# Patient Record
Sex: MALE | Race: OTHER | HISPANIC OR LATINO | Employment: UNEMPLOYED | URBAN - METROPOLITAN AREA
[De-identification: names, ages, dates, MRNs, and addresses within clinical notes are randomized per-mention and may not be internally consistent; named-entity substitution may affect disease eponyms.]

---

## 2023-04-16 ENCOUNTER — HOSPITAL ENCOUNTER (EMERGENCY)
Facility: HOSPITAL | Age: 43
Discharge: HOME | End: 2023-04-16
Attending: INTERNAL MEDICINE

## 2023-04-16 VITALS
HEIGHT: 69 IN | WEIGHT: 200 LBS | OXYGEN SATURATION: 98 % | SYSTOLIC BLOOD PRESSURE: 131 MMHG | HEART RATE: 77 BPM | RESPIRATION RATE: 20 BRPM | TEMPERATURE: 97 F | DIASTOLIC BLOOD PRESSURE: 87 MMHG | BODY MASS INDEX: 29.62 KG/M2

## 2023-04-16 DIAGNOSIS — T15.01XA FOREIGN BODY OF RIGHT CORNEA, INITIAL ENCOUNTER: Primary | ICD-10-CM

## 2023-04-16 PROCEDURE — 65220 REMOVE FOREIGN BODY FROM EYE: CPT

## 2023-04-16 PROCEDURE — 99283 EMERGENCY DEPT VISIT LOW MDM: CPT | Mod: 25

## 2023-04-16 PROCEDURE — 25000003 PHARM REV CODE 250: Performed by: INTERNAL MEDICINE

## 2023-04-16 PROCEDURE — 65222 REMOVE FOREIGN BODY FROM EYE: CPT | Mod: RT

## 2023-04-16 RX ORDER — PROPARACAINE HYDROCHLORIDE 5 MG/ML
1 SOLUTION/ DROPS OPHTHALMIC
Status: COMPLETED | OUTPATIENT
Start: 2023-04-16 | End: 2023-04-16

## 2023-04-16 RX ORDER — ERYTHROMYCIN 5 MG/G
OINTMENT OPHTHALMIC EVERY 4 HOURS
Qty: 3.5 G | Refills: 0 | Status: SHIPPED | OUTPATIENT
Start: 2023-04-16 | End: 2023-04-26

## 2023-04-16 RX ORDER — ERYTHROMYCIN 5 MG/G
OINTMENT OPHTHALMIC
Status: COMPLETED | OUTPATIENT
Start: 2023-04-16 | End: 2023-04-16

## 2023-04-16 RX ADMIN — PROPARACAINE HYDROCHLORIDE 1 DROP: 5 SOLUTION/ DROPS OPHTHALMIC at 02:04

## 2023-04-16 RX ADMIN — FLUORESCEIN SODIUM 1 EACH: 1 STRIP OPHTHALMIC at 02:04

## 2023-04-16 RX ADMIN — ERYTHROMYCIN: 5 OINTMENT OPHTHALMIC at 03:04

## 2023-04-16 NOTE — ED TRIAGE NOTES
C/o r. Eye pain w/ swelling and redness x 1 day, states cutting metal introduced to eye while at work. [place] [facility] [place] was not [place]. Has been applying Visine.

## 2023-04-16 NOTE — ED PROVIDER NOTES
Source of History:  Patient, no limitations    Chief complaint:  Foreign Body in Eye (C/o r. Eye pain w/ swelling and redness x 1 day, states cutting metal introduced to eye while at work. Seen at [facility] [place] night, reports Woods lamp was not working)      HPI:  Gaston Valverde is a 43 y.o. male presenting with Foreign Body in Eye (C/o r. Eye pain w/ swelling and redness x 1 day, states cutting metal introduced to eye while at work. Seen at [facility] [place] on last night, reports Woods lamp was not working)       Small metal debris in right eye at 4 o'clock, onset 1 day, no vision changes   Patient presents for evaluation of foreign body sensation to the right eye . Onset of symptoms was abrupt starting 1 day ago, with unchanged since that time. There is no discharge present. There is a history trauma to the eye. There is a history of foreign body getting into eye. The patient is not a contact lens wearer.        Review of Systems   Constitutional symptoms:  Negative except as documented in HPI.   Skin symptoms:  Negative except as documented in HPI.   HEENT symptoms:  Negative except as documented in HPI.   Respiratory symptoms:  Negative except as documented in HPI.   Cardiovascular symptoms:  Negative except as documented in HPI.   Gastrointestinal symptoms:  Negative except as documented in HPI.    Genitourinary symptoms:  Negative except as documented in HPI.   Musculoskeletal symptoms:  Negative except as documented in HPI.   Neurologic symptoms:  Negative except as documented in HPI.   Psychiatric symptoms:  Negative except as documented in HPI.   Allergy/immunologic symptoms:  Negative except as documented in HPI.             Additional review of systems information: All other systems reviewed and otherwise negative.      Review of patient's allergies indicates:  No Known Allergies    PMH:  As per HPI and below:    History reviewed. No pertinent past medical history.     History reviewed. No pertinent family  "history.    History reviewed. No pertinent surgical history.    Social History     Tobacco Use    Smoking status: Never    Smokeless tobacco: Never   Substance Use Topics    Alcohol use: Not Currently    Drug use: Never       There is no problem list on file for this patient.       Physical Exam:    /87 (BP Location: Left arm, Patient Position: Sitting)   Pulse 77   Temp 97.3 °F (36.3 °C) (Oral)   Resp 20   Ht 5' 9" (1.753 m)   Wt 90.7 kg (200 lb)   SpO2 98%   BMI 29.53 kg/m²     Nursing note and vital signs reviewed.    General:  Alert, no acute distress.   Skin: Normal for Ethnic Origin, No cyanosis  HEENT: Normocephalic and atraumatic, Vision unchanged, Pupils symmetric, No icterus , Nasal mucosa is pink and moist  1427  Right Eye  Right Visual Status Uncorrected   Right Visual Test 20/25   Left Eye  Left Visual Status Uncorrected   Left Visual Test 20/25   Both Eyes  Both Visual Status Uncorrected   Both Visual Test  20/25        Positive Fluorescene  Positive FB's noted right eye at 4 o'clock    Cardiovascular:  Regular rate and rhythm, No edema  Chest Wall: No deformity, equal chest rise  Respiratory:  Lungs are clear to auscultation, respirations are non-labored.    Musculoskeletal:  No deformity, Normal perfusion to all extremities  Gastrointestinal:  Soft, Non distended  Neurological:  Alert and oriented, normal motor observed, normal speech observed.    Psychiatric:  Cooperative, appropriate mood & affect.        Labs that have been ordered have been independently reviewed and interpreted by myself.     Old Chart Reviewed.      Initial Impression/ Differential Dx:  Foreign body, corneal abrasion, allergic conjunctivitis, viral conjunctivitis, traumatic iritis, ruptured globe, hordeolum, blepharitis      MDM:      Reviewed Nurses Note.    Reviewed Pertinent old records.    Orders Placed This Encounter    FOREIGN BODY REMOVAL    Visual acuity screening    Visual acuity screening    Bring Wood's " Lamp to Bedside    Bring Tonopen to Bedside    fluorescein ophthalmic strip 1 each    proparacaine 0.5 % ophthalmic solution 1 drop    erythromycin 5 mg/gram (0.5 %) ophthalmic ointment    erythromycin (ROMYCIN) ophthalmic ointment                    Labs Reviewed - No data to display       No orders to display        No visits with results within 1 Day(s) from this visit.   Latest known visit with results is:   No results found for any previous visit.       Imaging Results    None                                        Foreign Body    Date/Time: 4/16/2023 3:02 PM  Performed by: Evin Santana DO  Authorized by: Evin Santana DO   Consent Done: Yes  Consent: Verbal consent obtained.  Risks and benefits: risks, benefits and alternatives were discussed  Consent given by: patient  Patient identity confirmed: verbally with patient  Body area: eye  Location details: right cornea    Anesthesia:  Local Anesthetic: proparacaine drops  Localization method: slit lamp, magnification and visualized  Removal mechanism: 25-gauge needle, moist cotton swab, irrigation and ophthalmic lorraine  Eye examined with fluorescein.  Corneal abrasion size: small  Corneal abrasion location: medial  Dressing: antibiotic ointment  Depth: embedded  Complexity: complex  1 objects recovered.  Objects recovered: metal debris  Post-procedure assessment: foreign body removed  Patient tolerance: Patient tolerated the procedure well with no immediate complications          Diagnostic Impression:    1. Foreign body of right cornea, initial encounter         ED Disposition Condition    Discharge Stable             Follow-up Information       [facility] - Emergency Dept.    Specialty: Emergency Medicine  Why: If symptoms worsen  Contact information:  [address]  [facility] [id number] [phone]             Schedule an appointment as soon as possible for a visit  with Jameson Ferreira MD.     Specialty: Ophthalmology  Why: If symptoms worsen  Contact information:  [address]  [address]  [facility] [address]  [phone]                              ED Prescriptions       Medication Sig Dispense Start Date End Date Auth. Provider    erythromycin (ROMYCIN) ophthalmic ointment Place into the right eye every 4 (four) hours. for 10 days 3.5 g 4/16/2023 4/26/2023 Evin Santana DO          Follow-up Information       Follow up With Specialties Details Why Contact Info    [facility] - Emergency Dept Emergency Medicine  If symptoms worsen [address]  [facility] [id number] [phone]    Jameson Ferreira MD Ophthalmology Schedule an appointment as soon as possible for a visit  If symptoms worsen [address]  [address]  [facility] [address]  [phone]               Evin Santana DO  04/16/23 1521